# Patient Record
Sex: FEMALE
[De-identification: names, ages, dates, MRNs, and addresses within clinical notes are randomized per-mention and may not be internally consistent; named-entity substitution may affect disease eponyms.]

---

## 2021-07-01 PROBLEM — Z00.00 ENCOUNTER FOR PREVENTIVE HEALTH EXAMINATION: Status: ACTIVE | Noted: 2021-07-01

## 2021-07-06 ENCOUNTER — APPOINTMENT (OUTPATIENT)
Dept: NEUROSURGERY | Facility: CLINIC | Age: 41
End: 2021-07-06
Payer: MEDICARE

## 2021-07-06 VITALS
SYSTOLIC BLOOD PRESSURE: 144 MMHG | HEIGHT: 63 IN | TEMPERATURE: 97.9 F | DIASTOLIC BLOOD PRESSURE: 73 MMHG | OXYGEN SATURATION: 99 % | HEART RATE: 66 BPM | BODY MASS INDEX: 27.46 KG/M2 | WEIGHT: 155 LBS

## 2021-07-06 DIAGNOSIS — M54.16 RADICULOPATHY, LUMBAR REGION: ICD-10-CM

## 2021-07-06 DIAGNOSIS — M54.2 CERVICALGIA: ICD-10-CM

## 2021-07-06 DIAGNOSIS — D33.2 BENIGN NEOPLASM OF BRAIN, UNSPECIFIED: ICD-10-CM

## 2021-07-06 PROCEDURE — 99203 OFFICE O/P NEW LOW 30 MIN: CPT

## 2021-07-14 NOTE — ASSESSMENT
[FreeTextEntry1] : For her pituitary tumor she needs a follow up MRI in October since at this point is non operative.\par For her low back pain and leg pain i will have her do a MRI of the lumbar spine and xrays. Since the pain is constant and PT and injections have not helped.\par We reviewed her MRi and lab tests available today.

## 2021-07-14 NOTE — PHYSICAL EXAM
[General Appearance - Alert] : alert [General Appearance - In No Acute Distress] : in no acute distress [Oriented To Time, Place, And Person] : oriented to person, place, and time [Cranial Nerves Optic (II)] : visual acuity intact bilaterally,  pupils equal round and reactive to light [Cranial Nerves Oculomotor (III)] : extraocular motion intact [Cranial Nerves Trigeminal (V)] : facial sensation intact symmetrically [Cranial Nerves Facial (VII)] : face symmetrical [Cranial Nerves Vestibulocochlear (VIII)] : hearing was intact bilaterally [Cranial Nerves Glossopharyngeal (IX)] : tongue and palate midline [Cranial Nerves Accessory (XI - Cranial And Spinal)] : head turning and shoulder shrug symmetric [Cranial Nerves Hypoglossal (XII)] : there was no tongue deviation with protrusion [Spurling's Same Side] : Positive Spurling's on same side [L-Spine ___ (level)] : ~Ulevel [unfilled] lumbar spine [Right Paraspinal ___ (level)] : ~Ulevel [unfilled] right paraspinal [Restricted] : was restricted [Pain] : was painful [SLR] : positive Straight Leg Raise [Pain / Temp Decrease Sensory Level At Hands - Right Only] : C7 sensory impairment [Pain / Temp Decrease - Root / Radicular Distribution] : C8 sensory impairment [Pain / Temp Decrease Lateral Leg & Dorsum Of Foot Right Only] : L5 sensory impairment [Pain / Temp Decrease Lateral Leg & Dorsum Of Foot Left Only] : L5 sensory impairment [5] : 5/5 Ankle Plantar Flexion (S1) [2] : 2/4 Biceps Reflex [3] : 3/4 Ankle Jerk Reflex [FreeTextEntry5] : visual fields are full

## 2021-07-14 NOTE — HISTORY OF PRESENT ILLNESS
[> 3 months] : more  than 3 months [FreeTextEntry1] : pituitary tumor  [de-identified] : She was diagnosed with a Pituitary tumor incidentally when she had studies done for her shoulder. She does not remember the size. She has not seen an endocrinologist and she saw the ophthalmologist for visual fields. She has intermittent blurry vision. She has normal periods but more bleeding. She has no polydipsia or polyuria. Her last two MRI's showed increased in size of the tumor.\par She also has low back pain and leg pain that started about 20 yrs ago. It got worse in 2017  with right leg pain. Since then her pain is constant and has a crisis where she has difficulty moving happens about two months apart.  SHe has only pain in the right leg. She also has numbness or weakness. She has pain when she sneezes or coughs. The worse position is to sleep on her stomach.She has had injections too with no help. She has no bowel or bladder incontinence.